# Patient Record
Sex: FEMALE | Race: WHITE | NOT HISPANIC OR LATINO | Employment: OTHER | URBAN - METROPOLITAN AREA
[De-identification: names, ages, dates, MRNs, and addresses within clinical notes are randomized per-mention and may not be internally consistent; named-entity substitution may affect disease eponyms.]

---

## 2023-08-11 ENCOUNTER — OFFICE VISIT (OUTPATIENT)
Dept: URGENT CARE | Facility: CLINIC | Age: 78
End: 2023-08-11
Payer: MEDICARE

## 2023-08-11 VITALS
TEMPERATURE: 97.2 F | RESPIRATION RATE: 15 BRPM | HEIGHT: 67 IN | SYSTOLIC BLOOD PRESSURE: 128 MMHG | BODY MASS INDEX: 22.6 KG/M2 | OXYGEN SATURATION: 97 % | HEART RATE: 76 BPM | WEIGHT: 144 LBS | DIASTOLIC BLOOD PRESSURE: 82 MMHG

## 2023-08-11 DIAGNOSIS — L08.9 INFECTION OF FINGER: ICD-10-CM

## 2023-08-11 PROCEDURE — 3074F SYST BP LT 130 MM HG: CPT | Performed by: PHYSICIAN ASSISTANT

## 2023-08-11 PROCEDURE — 99204 OFFICE O/P NEW MOD 45 MIN: CPT | Performed by: PHYSICIAN ASSISTANT

## 2023-08-11 PROCEDURE — 3079F DIAST BP 80-89 MM HG: CPT | Performed by: PHYSICIAN ASSISTANT

## 2023-08-11 RX ORDER — ESTRADIOL 10 UG/1
10 TABLET VAGINAL
COMMUNITY
Start: 2023-06-05

## 2023-08-11 RX ORDER — CEPHALEXIN 500 MG/1
500 CAPSULE ORAL 4 TIMES DAILY
Qty: 28 CAPSULE | Refills: 0 | Status: SHIPPED | OUTPATIENT
Start: 2023-08-11 | End: 2023-08-18

## 2023-08-11 ASSESSMENT — ENCOUNTER SYMPTOMS
CHILLS: 0
FEVER: 0

## 2023-08-14 PROCEDURE — 90471 IMMUNIZATION ADMIN: CPT | Performed by: PHYSICIAN ASSISTANT

## 2023-08-14 PROCEDURE — 90714 TD VACC NO PRESV 7 YRS+ IM: CPT | Performed by: PHYSICIAN ASSISTANT

## 2024-02-09 NOTE — PROGRESS NOTES
"  Subjective:   Delores Brantley is a 77 y.o. female who presents today with   Chief Complaint   Patient presents with    Finger Pain     Possible infection. Middle finger on right hand      Other  This is a new problem. The current episode started yesterday. The problem occurs constantly. The problem has been gradually worsening. Pertinent negatives include no chills or fever. She has tried nothing for the symptoms. The treatment provided no relief.     Patient states she got a wood splinter in her right middle finger a few days ago and was fairly certain that she got it out.  She states that it started having swelling and redness to the pad of her right middle finger yesterday and it got more swollen today.    Patient states the wood splinter happened when she was dusting.    PMH:  has no past medical history on file.  MEDS:   Current Outpatient Medications:     YUVAFEM 10 MCG Tab, Insert 10 mcg into the vagina two times a week., Disp: , Rfl:     cephALEXin (KEFLEX) 500 MG Cap, Take 1 Capsule by mouth 4 times a day for 7 days., Disp: 28 Capsule, Rfl: 0  ALLERGIES: No Known Allergies  SURGHX: History reviewed. No pertinent surgical history.  SOCHX:  reports that she has never smoked. She has never used smokeless tobacco. She reports that she does not use drugs.  FH: Reviewed with patient, not pertinent to this visit.     Review of Systems   Constitutional:  Negative for chills and fever.   Skin:         Infection of finger      Objective:   /82 (BP Location: Right arm, Patient Position: Sitting, BP Cuff Size: Adult long)   Pulse 76   Temp 36.2 °C (97.2 °F) (Temporal)   Resp 15   Ht 1.702 m (5' 7\")   Wt 65.3 kg (144 lb)   SpO2 97%   BMI 22.55 kg/m²   Physical Exam  Vitals and nursing note reviewed.   Constitutional:       General: She is not in acute distress.     Appearance: Normal appearance. She is well-developed. She is not ill-appearing or toxic-appearing.   HENT:      Head: Normocephalic and " atraumatic.      Right Ear: Hearing normal.      Left Ear: Hearing normal.   Cardiovascular:      Rate and Rhythm: Normal rate.   Pulmonary:      Effort: Pulmonary effort is normal.   Musculoskeletal:        Hands:       Comments: Patient has full flexion and extension of the third digit of the right hand.  Slightly limited secondary to swelling distally.  Neurovascular intact distally.  Less than 2 capillary refill.   Skin:     General: Skin is warm and dry.      Comments: Erythematous and indurated area to the pad of the right index finger.  No induration or fluctuance to the distal aspect of the finger.  No foreign body noted.   Neurological:      Mental Status: She is alert.      Coordination: Coordination normal.   Psychiatric:         Mood and Affect: Mood normal.         Assessment/Plan:   Assessment    1. Infection of finger  - cephALEXin (KEFLEX) 500 MG Cap; Take 1 Capsule by mouth 4 times a day for 7 days.  Dispense: 28 Capsule; Refill: 0    Other orders  - YUVAFEM 10 MCG Tab; Insert 10 mcg into the vagina two times a week.    Verbal consent obtained prior to local incision and drainage of the area.  Was able to clean the area with alcohol swab and used new sterile 18-gauge needle after pain ease spray was sprayed to the pad of the right middle finger.  Was advanced superficially at the skin and small amount of purulent drainage as well as some bleeding occurred and area seem to flatten out and patient tolerated well and noticed that there was not as much pressure there now.  Antibiotic ointment and bandage placed to the area after area was cleaned again.  Attempted to call patient back after she had left regarding tetanus as there are no records of tetanus on her records.  Would suggest updating tetanus today.  She understands to have low threshold to go into the ER if any new or worsening symptoms.  Voicemail was left for her to come back and have tetanus shot updated as she has not had it done within  the last 10 years.    Would recommend treating with warm Epsom salt soaks and starting her on oral antibiotic at this time.  Differential diagnosis, natural history, supportive care, and indications for immediate follow-up discussed.   Patient given instructions and understanding of medications and treatment.    If not improving in 3-5 days, F/U with PCP or return to UC if symptoms worsen.  Strict ER precautions given.  Patient agreeable to plan.    Patient returns to have tetanus updated today. 8/14/22    Please note that this dictation was created using voice recognition software. I have made every reasonable attempt to correct obvious errors, but I expect that there are errors of grammar and possibly content that I did not discover before finalizing the note.    Segundo Rosas PA-C     Female